# Patient Record
Sex: FEMALE | Race: WHITE | Employment: FULL TIME | URBAN - METROPOLITAN AREA
[De-identification: names, ages, dates, MRNs, and addresses within clinical notes are randomized per-mention and may not be internally consistent; named-entity substitution may affect disease eponyms.]

---

## 2024-08-16 ENCOUNTER — ANESTHESIA EVENT (OUTPATIENT)
Facility: HOSPITAL | Age: 44
End: 2024-08-16
Payer: COMMERCIAL

## 2024-08-23 ENCOUNTER — ANESTHESIA (OUTPATIENT)
Facility: HOSPITAL | Age: 44
End: 2024-08-23
Payer: COMMERCIAL

## 2024-08-23 ENCOUNTER — HOSPITAL ENCOUNTER (OUTPATIENT)
Facility: HOSPITAL | Age: 44
Setting detail: OUTPATIENT SURGERY
Discharge: HOME | End: 2024-08-23
Attending: PLASTIC SURGERY | Admitting: PLASTIC SURGERY
Payer: COMMERCIAL

## 2024-08-23 VITALS
HEIGHT: 64 IN | WEIGHT: 211.1 LBS | SYSTOLIC BLOOD PRESSURE: 109 MMHG | DIASTOLIC BLOOD PRESSURE: 65 MMHG | HEART RATE: 99 BPM | OXYGEN SATURATION: 94 % | BODY MASS INDEX: 36.04 KG/M2 | TEMPERATURE: 98 F | RESPIRATION RATE: 18 BRPM

## 2024-08-23 PROCEDURE — 6360000002 HC RX W HCPCS: Performed by: PLASTIC SURGERY

## 2024-08-23 PROCEDURE — A4217 STERILE WATER/SALINE, 500 ML: HCPCS | Performed by: PLASTIC SURGERY

## 2024-08-23 PROCEDURE — 3700000000 HC ANESTHESIA ATTENDED CARE: Performed by: PLASTIC SURGERY

## 2024-08-23 PROCEDURE — 6360000002 HC RX W HCPCS: Performed by: NURSE ANESTHETIST, CERTIFIED REGISTERED

## 2024-08-23 PROCEDURE — 7100000010 HC PHASE II RECOVERY - FIRST 15 MIN: Performed by: PLASTIC SURGERY

## 2024-08-23 PROCEDURE — 6360000002 HC RX W HCPCS: Performed by: ANESTHESIOLOGY

## 2024-08-23 PROCEDURE — 2500000003 HC RX 250 WO HCPCS: Performed by: PLASTIC SURGERY

## 2024-08-23 PROCEDURE — 7100000011 HC PHASE II RECOVERY - ADDTL 15 MIN: Performed by: PLASTIC SURGERY

## 2024-08-23 PROCEDURE — 3600000012 HC SURGERY LEVEL 2 ADDTL 15MIN: Performed by: PLASTIC SURGERY

## 2024-08-23 PROCEDURE — 2500000003 HC RX 250 WO HCPCS: Performed by: NURSE ANESTHETIST, CERTIFIED REGISTERED

## 2024-08-23 PROCEDURE — 7100000000 HC PACU RECOVERY - FIRST 15 MIN: Performed by: PLASTIC SURGERY

## 2024-08-23 PROCEDURE — 3700000001 HC ADD 15 MINUTES (ANESTHESIA): Performed by: PLASTIC SURGERY

## 2024-08-23 PROCEDURE — 2709999900 HC NON-CHARGEABLE SUPPLY: Performed by: PLASTIC SURGERY

## 2024-08-23 PROCEDURE — 2580000003 HC RX 258: Performed by: PLASTIC SURGERY

## 2024-08-23 PROCEDURE — 3600000002 HC SURGERY LEVEL 2 BASE: Performed by: PLASTIC SURGERY

## 2024-08-23 PROCEDURE — 7100000001 HC PACU RECOVERY - ADDTL 15 MIN: Performed by: PLASTIC SURGERY

## 2024-08-23 PROCEDURE — 88300 SURGICAL PATH GROSS: CPT

## 2024-08-23 RX ORDER — SODIUM CHLORIDE, SODIUM LACTATE, POTASSIUM CHLORIDE, CALCIUM CHLORIDE 600; 310; 30; 20 MG/100ML; MG/100ML; MG/100ML; MG/100ML
INJECTION, SOLUTION INTRAVENOUS CONTINUOUS
Status: DISCONTINUED | OUTPATIENT
Start: 2024-08-23 | End: 2024-08-23 | Stop reason: HOSPADM

## 2024-08-23 RX ORDER — LIDOCAINE HYDROCHLORIDE 20 MG/ML
INJECTION, SOLUTION INTRAVENOUS PRN
Status: DISCONTINUED | OUTPATIENT
Start: 2024-08-23 | End: 2024-08-23 | Stop reason: SDUPTHER

## 2024-08-23 RX ORDER — HYDROMORPHONE HYDROCHLORIDE 1 MG/ML
0.5 INJECTION, SOLUTION INTRAMUSCULAR; INTRAVENOUS; SUBCUTANEOUS EVERY 5 MIN PRN
Status: DISCONTINUED | OUTPATIENT
Start: 2024-08-23 | End: 2024-08-23 | Stop reason: HOSPADM

## 2024-08-23 RX ORDER — DEXMEDETOMIDINE HYDROCHLORIDE 100 UG/ML
INJECTION, SOLUTION INTRAVENOUS PRN
Status: DISCONTINUED | OUTPATIENT
Start: 2024-08-23 | End: 2024-08-23 | Stop reason: SDUPTHER

## 2024-08-23 RX ORDER — OXYCODONE HYDROCHLORIDE 5 MG/1
5 TABLET ORAL PRN
Status: DISCONTINUED | OUTPATIENT
Start: 2024-08-23 | End: 2024-08-23 | Stop reason: HOSPADM

## 2024-08-23 RX ORDER — ONDANSETRON 2 MG/ML
INJECTION INTRAMUSCULAR; INTRAVENOUS PRN
Status: DISCONTINUED | OUTPATIENT
Start: 2024-08-23 | End: 2024-08-23 | Stop reason: SDUPTHER

## 2024-08-23 RX ORDER — EPHEDRINE SULFATE/0.9% NACL/PF 50 MG/5 ML
SYRINGE (ML) INTRAVENOUS PRN
Status: DISCONTINUED | OUTPATIENT
Start: 2024-08-23 | End: 2024-08-23 | Stop reason: SDUPTHER

## 2024-08-23 RX ORDER — ONDANSETRON 2 MG/ML
4 INJECTION INTRAMUSCULAR; INTRAVENOUS
Status: COMPLETED | OUTPATIENT
Start: 2024-08-23 | End: 2024-08-23

## 2024-08-23 RX ORDER — OXYCODONE HYDROCHLORIDE 5 MG/1
10 TABLET ORAL PRN
Status: DISCONTINUED | OUTPATIENT
Start: 2024-08-23 | End: 2024-08-23 | Stop reason: HOSPADM

## 2024-08-23 RX ORDER — PROPOFOL 10 MG/ML
INJECTION, EMULSION INTRAVENOUS PRN
Status: DISCONTINUED | OUTPATIENT
Start: 2024-08-23 | End: 2024-08-23 | Stop reason: SDUPTHER

## 2024-08-23 RX ORDER — FENTANYL CITRATE 50 UG/ML
INJECTION, SOLUTION INTRAMUSCULAR; INTRAVENOUS PRN
Status: DISCONTINUED | OUTPATIENT
Start: 2024-08-23 | End: 2024-08-23 | Stop reason: SDUPTHER

## 2024-08-23 RX ORDER — FENTANYL CITRATE 50 UG/ML
50 INJECTION, SOLUTION INTRAMUSCULAR; INTRAVENOUS EVERY 5 MIN PRN
Status: COMPLETED | OUTPATIENT
Start: 2024-08-23 | End: 2024-08-23

## 2024-08-23 RX ORDER — NALOXONE HYDROCHLORIDE 0.4 MG/ML
INJECTION, SOLUTION INTRAMUSCULAR; INTRAVENOUS; SUBCUTANEOUS PRN
Status: DISCONTINUED | OUTPATIENT
Start: 2024-08-23 | End: 2024-08-23 | Stop reason: HOSPADM

## 2024-08-23 RX ORDER — DEXAMETHASONE SODIUM PHOSPHATE 4 MG/ML
INJECTION, SOLUTION INTRA-ARTICULAR; INTRALESIONAL; INTRAMUSCULAR; INTRAVENOUS; SOFT TISSUE PRN
Status: DISCONTINUED | OUTPATIENT
Start: 2024-08-23 | End: 2024-08-23 | Stop reason: SDUPTHER

## 2024-08-23 RX ADMIN — PROPOFOL 30 MG: 10 INJECTION, EMULSION INTRAVENOUS at 14:03

## 2024-08-23 RX ADMIN — FENTANYL CITRATE 50 MCG: 50 INJECTION, SOLUTION INTRAMUSCULAR; INTRAVENOUS at 13:18

## 2024-08-23 RX ADMIN — PROPOFOL 50 MG: 10 INJECTION, EMULSION INTRAVENOUS at 14:02

## 2024-08-23 RX ADMIN — FENTANYL CITRATE 50 MCG: 50 INJECTION INTRAMUSCULAR; INTRAVENOUS at 16:25

## 2024-08-23 RX ADMIN — FENTANYL CITRATE 50 MCG: 50 INJECTION, SOLUTION INTRAMUSCULAR; INTRAVENOUS at 14:09

## 2024-08-23 RX ADMIN — PROPOFOL 30 MCG/KG/MIN: 10 INJECTION, EMULSION INTRAVENOUS at 13:34

## 2024-08-23 RX ADMIN — PROPOFOL 200 MG: 10 INJECTION, EMULSION INTRAVENOUS at 13:07

## 2024-08-23 RX ADMIN — ONDANSETRON HYDROCHLORIDE 4 MG: 2 INJECTION INTRAMUSCULAR; INTRAVENOUS at 14:56

## 2024-08-23 RX ADMIN — DEXMEDETOMIDINE HYDROCHLORIDE 10 MCG: 100 INJECTION, SOLUTION INTRAVENOUS at 13:06

## 2024-08-23 RX ADMIN — LIDOCAINE HYDROCHLORIDE 100 MG: 20 INJECTION, SOLUTION INTRAVENOUS at 13:07

## 2024-08-23 RX ADMIN — DEXMEDETOMIDINE HYDROCHLORIDE 10 MCG: 100 INJECTION, SOLUTION INTRAVENOUS at 13:00

## 2024-08-23 RX ADMIN — PROPOFOL 30 MG: 10 INJECTION, EMULSION INTRAVENOUS at 15:26

## 2024-08-23 RX ADMIN — PROPOFOL 50 MG: 10 INJECTION, EMULSION INTRAVENOUS at 13:08

## 2024-08-23 RX ADMIN — WATER 2000 MG: 1 INJECTION INTRAMUSCULAR; INTRAVENOUS; SUBCUTANEOUS at 13:15

## 2024-08-23 RX ADMIN — SODIUM CHLORIDE, POTASSIUM CHLORIDE, SODIUM LACTATE AND CALCIUM CHLORIDE: 600; 310; 30; 20 INJECTION, SOLUTION INTRAVENOUS at 10:43

## 2024-08-23 RX ADMIN — FENTANYL CITRATE 50 MCG: 50 INJECTION INTRAMUSCULAR; INTRAVENOUS at 16:36

## 2024-08-23 RX ADMIN — ONDANSETRON 4 MG: 2 INJECTION INTRAMUSCULAR; INTRAVENOUS at 16:23

## 2024-08-23 RX ADMIN — Medication 10 MG: at 13:53

## 2024-08-23 RX ADMIN — FENTANYL CITRATE 50 MCG: 50 INJECTION, SOLUTION INTRAMUSCULAR; INTRAVENOUS at 13:41

## 2024-08-23 RX ADMIN — Medication 10 MG: at 13:30

## 2024-08-23 RX ADMIN — DEXAMETHASONE SODIUM PHOSPHATE 4 MG: 4 INJECTION, SOLUTION INTRAMUSCULAR; INTRAVENOUS at 13:21

## 2024-08-23 RX ADMIN — FENTANYL CITRATE 50 MCG: 50 INJECTION, SOLUTION INTRAMUSCULAR; INTRAVENOUS at 14:48

## 2024-08-23 RX ADMIN — SODIUM CHLORIDE, POTASSIUM CHLORIDE, SODIUM LACTATE AND CALCIUM CHLORIDE: 600; 310; 30; 20 INJECTION, SOLUTION INTRAVENOUS at 16:20

## 2024-08-23 ASSESSMENT — PAIN - FUNCTIONAL ASSESSMENT
PAIN_FUNCTIONAL_ASSESSMENT: ADULT NONVERBAL PAIN SCALE (NPVS)
PAIN_FUNCTIONAL_ASSESSMENT: 0-10
PAIN_FUNCTIONAL_ASSESSMENT: ACTIVITIES ARE NOT PREVENTED
PAIN_FUNCTIONAL_ASSESSMENT: 0-10
PAIN_FUNCTIONAL_ASSESSMENT: 0-10
PAIN_FUNCTIONAL_ASSESSMENT: ADULT NONVERBAL PAIN SCALE (NPVS)
PAIN_FUNCTIONAL_ASSESSMENT: ACTIVITIES ARE NOT PREVENTED
PAIN_FUNCTIONAL_ASSESSMENT: 0-10
PAIN_FUNCTIONAL_ASSESSMENT: ACTIVITIES ARE NOT PREVENTED
PAIN_FUNCTIONAL_ASSESSMENT: 0-10
PAIN_FUNCTIONAL_ASSESSMENT: 0-10

## 2024-08-23 ASSESSMENT — PAIN SCALES - GENERAL
PAINLEVEL_OUTOF10: 5
PAINLEVEL_OUTOF10: 6

## 2024-08-23 ASSESSMENT — PAIN DESCRIPTION - ORIENTATION: ORIENTATION: MID

## 2024-08-23 ASSESSMENT — LIFESTYLE VARIABLES: SMOKING_STATUS: 0

## 2024-08-23 ASSESSMENT — PAIN DESCRIPTION - LOCATION: LOCATION: ABDOMEN

## 2024-08-23 ASSESSMENT — PAIN DESCRIPTION - DESCRIPTORS
DESCRIPTORS: ACHING

## 2024-08-23 NOTE — PERIOP NOTE
Reviewed PTA medication list with patient/caregiver and patient/caregiver denies any additional medications.     Patient admits to having a responsible adult care for them at home for at least 24 hours after surgery.    Patient encouraged to use gown warming system and informed that using said warming gown to regulate body temperature prior to a procedure has been shown to help reduce the risks of blood clots and infection.    Patient's pharmacy of choice verified and documented in PTA medication section.    Dual skin assessment & fall risk band verification completed with Emilia ALANIZ RN.

## 2024-08-23 NOTE — DISCHARGE INSTRUCTIONS
Tummy Tuck: What to Expect at Home  Your Recovery  A tummy tuck is surgery to remove fat and skin from your belly and to tighten the stomach muscles. It is also called an abdominoplasty. The surgery makes your belly look flatter.  Your belly will be sore and swollen for the first week after surgery. You may need to wear a compression garment as you heal. The skin on your stomach will be mostly numb for several weeks to months. Feeling will return slowly. It's normal to feel tired while you are healing. It can take 5 to 6 weeks for your energy to return.  You may not be able to stand up straight when you first come home, but this will improve. You'll need to get up and walk every day to regain your normal movement. Between walks, move your feet and legs often.  A tummy tuck leaves a long scar that will fade with time. You also may have a small scar around your belly button.  This care sheet gives you a general idea about how long it will take for you to recover. But each person recovers at a different pace. Follow the steps below to get better as quickly as possible.  How can you care for yourself at home?  Activity    Rest when you feel tired. Getting enough sleep will help you recover.     Try to walk each day. Start by walking a little more than you did the day before. Bit by bit, increase the amount you walk. Walking boosts blood flow and helps prevent pneumonia and constipation.     Avoid abdominal exercises and strenuous activities, such as bicycle riding, jogging, weight lifting, or aerobic exercise, for 6 to 8 weeks.     For 6 weeks, avoid lifting anything that would make you strain. This may include heavy grocery bags and milk containers, a heavy briefcase or backpack, cat litter or dog food bags, a vacuum , or a child.     Ask your doctor when you can drive again.     Most people are able to return to work about 2 to 3 weeks after surgery. It depends on the type of work you do and how you feel.  results and keep a list of the medicines you take.  How can you care for yourself at home?  Take your pain medicine as soon as you have pain. It works better if you take it before the pain gets bad.  Call your doctor if you have any problems with your medicine.  To prevent dehydration, drink plenty of fluids. Choose water and other clear liquids until you feel better. If you have kidney, heart, or liver disease and have to limit fluids, talk with your doctor before you increase the amount of fluids you drink.  When you are able to eat, try clear soups, mild foods, and liquids until all symptoms are gone for 12 to 48 hours. Other good choices include dry toast, crackers, cooked cereal, and gelatin dessert, such as Jell-O.  Do not smoke. Smoking and being around smoke can make nausea worse.   When should you call for help?    Call your doctor now or seek immediate medical care if:    You have new or worse nausea or vomiting.     You are too sick to your stomach to drink any fluids.     You cannot keep down fluids.     You have symptoms of dehydration, such as:  Dry eyes and a dry mouth.  Passing only a little urine.  Feeling thirstier than usual.     You are dizzy or lightheaded, or you feel like you may faint.           Infection After Surgery: Care Instructions  Overview  After surgery, an infection is always possible. It doesn't mean that the surgery didn't go well.  How can you care for yourself at home?  Make sure your surgeon knows about the infection, especially if you saw another doctor about your symptoms.  If your doctor prescribed antibiotics, take them as directed. Do not stop taking them just because you feel better. You need to take the full course of antibiotics.  Keep the skin clean and dry.  You may have a bandage over the cut (incision). A bandage helps the incision heal and protects it. Your doctor will tell you how to take care of this. Keep it clean and dry. You may have drainage from the

## 2024-08-23 NOTE — H&P
HISTORY AND PHYSICAL             Date: 8/23/2024        Patient Name: Jenelle Marcial     YOB: 1980      Age:  43 y.o.    Chief Complaint    Excess abdominal skin      History Obtained From   patient    History of Present Illness   Weight loss    Past Medical History     Past Medical History:   Diagnosis Date    H/O esophageal ulcer     Migraine     Psoriatic arthritis (HCC)     PVC (premature ventricular contraction)     np Andra burr    Retention of urine     Succinylcholine adverse reaction     muscle tremors        Past Surgical History     Past Surgical History:   Procedure Laterality Date    HYSTERECTOMY (CERVIX STATUS UNKNOWN)      partial with full pelvic floor repair        Medications Prior to Admission     Prior to Admission medications    Medication Sig Start Date End Date Taking? Authorizing Provider   flecainide (TAMBOCOR) 100 MG tablet Take 1 tablet by mouth 2 times daily 6/20/24  Yes Deisi Gonzalez MD   esomeprazole Magnesium (NEXIUM) 20 MG PACK Take 1 packet by mouth daily   Yes Deisi Gonzalez MD   famotidine (PEPCID) 40 MG tablet Take 1 tablet by mouth daily   Yes Deisi Gonzalez MD   pantoprazole sodium (PROTONIX) 40 MG PACK packet Take 1 packet by mouth 2 times daily (before meals)   Yes Deisi Gonzalez MD   atorvastatin (LIPITOR) 20 MG tablet Take 1 tablet by mouth at bedtime   Yes Deisi Gonzalez MD   secukinumab (COSENTYX) 150 MG/ML SOSY Inject into the skin once Indications: psoriatic arthritis    Deisi Gonzalez MD   methotrexate (RHEUMATREX) 10 MG chemo tablet Take 2 tablets by mouth once a week Indications: wednesday    Deisi Gonzalez MD   ergocalciferol (ERGOCALCIFEROL) 1.25 MG (62825 UT) capsule Take 1 capsule by mouth once a week    Deisi Gonzalez MD   meclizine (ANTIVERT) 25 MG tablet Take 1 tablet by mouth 2 times daily as needed Indications: arrythmia    Deisi Gonzalez MD   folic acid (FOLVITE) 1 MG tablet

## 2024-08-23 NOTE — OP NOTE
Operative Note      Patient: Jenelle Marcial  YOB: 1980  MRN: [id number]    Date of Procedure: 8/23/2024    Pre-Op Diagnosis Codes:      * Encounter for cosmetic surgery [Z41.1]    Post-Op Diagnosis: Same       Procedure(s):  COSMETIC FULL TUMMY TUCK WITH LIPO HIP ROLLS \"SPEC POP\"    Surgeon(s):  Ankit Pierson MD    Assistant:   Surgical Assistant: Yuval Martinez    Anesthesia: General    Estimated Blood Loss (mL): 200     Complications: None    Specimens:   ID Type Source Tests Collected by Time Destination   A : pannus Tissue Abdomen SURGICAL PATHOLOGY Ankit Pierson MD 8/23/2024 1457        Implants:  * No implants in log *      Drains:   Closed/Suction Drain Anterior Abdomen Bulb (Active)   Site Description Clean, dry & intact 08/23/24 1502   Dressing Status Clean, dry & intact 08/23/24 1502   Drainage Appearance Serosanguinous 08/23/24 1502   Drain Status Compressed;To bulb suction 08/23/24 1502       Findings:  Infection Present At Time Of Surgery (PATOS) (choose all levels that have infection present):  No infection present  Other Findings: none    Detailed Description of Procedure:   dictated    Electronically signed by ANKIT PIERSON MD on 8/23/2024 at 3:27 PM

## 2024-08-23 NOTE — ANESTHESIA PRE PROCEDURE
Other Findings:       Anesthesia Plan      general     ASA 3       Induction: intravenous.    MIPS: Postoperative opioids intended.  Anesthetic plan and risks discussed with patient.      Plan discussed with CRNA.                Blaine Quevedo MD   8/23/2024

## 2024-08-23 NOTE — ANESTHESIA POSTPROCEDURE EVALUATION
Post-Anesthesia Evaluation & Assessment    Vitals  BP: 123/60  Temp: 98.2 °F (36.8 °C)  Temp Source: Tympanic  Pulse: 62  Respirations: 18  SpO2: 99 %  Height: 163.8 cm (5' 4.49\")  Weight - Scale: 95.8 kg (211 lb 1.6 oz)  Pain Level: 5    Nausea/Vomiting: Controlled.    Post-operative hydration adequate.    Pain managed.    Mental status & Level of consciousness: alert and oriented x 3    Neurological status: moves all extremities, sensation grossly intact    Pulmonary status: airway patent, adequate oxygenation.    Complications related to anesthesia: none    Patient has met all PACU discharge requirements.      Cliff Traylor, DO

## 2024-08-23 NOTE — PERIOP NOTE
The discharge information has been reviewed with the patient and caregiver.  Patient armband removed and shredded  The patient and caregiver verbalized understanding.  Discharge medications reviewed with the patient and caregiver and appropriate educational materials and side effects teaching were provided.

## 2024-08-23 NOTE — BRIEF OP NOTE
Brief Postoperative Note      Patient: Jenelle Marcial  YOB: 1980  MRN: [id number]    Date of Procedure: 8/23/2024    Pre-Op Diagnosis Codes:      * Encounter for cosmetic surgery [Z41.1]    Post-Op Diagnosis: Same       Procedure(s):  COSMETIC FULL TUMMY TUCK WITH LIPO HIP ROLLS \"SPEC POP\"    Surgeon(s):  Ankit Pierson MD    Assistant:  Surgical Assistant: Yuval Martinez    Anesthesia: General    Estimated Blood Loss (mL): 200     Complications: None    Specimens:   ID Type Source Tests Collected by Time Destination   A : pannus Tissue Abdomen SURGICAL PATHOLOGY Ankit Pierson MD 8/23/2024 1457        Implants:  * No implants in log *      Drains:   Closed/Suction Drain Anterior Abdomen Bulb (Active)   Site Description Clean, dry & intact 08/23/24 1502   Dressing Status Clean, dry & intact 08/23/24 1502   Drainage Appearance Serosanguinous 08/23/24 1502   Drain Status Compressed;To bulb suction 08/23/24 1502       Findings:  Infection Present At Time Of Surgery (PATOS) (choose all levels that have infection present):  No infection present  Other Findings: none    Electronically signed by ANKIT PIERSON MD on 8/23/2024 at 3:27 PM

## 2024-08-23 NOTE — PERIOP NOTE
TRANSFER - IN REPORT:    Verbal report received from OR RN and CRNA  on Jenelle Marcial  being received from OR RN  for routine post-op      Report consisted of patient's Situation, Background, Assessment and   Recommendations(SBAR).     Information from the following report(s) Nurse Handoff Report, Adult Overview, Surgery Report, Intake/Output, and MAR was reviewed with the receiving nurse.    Opportunity for questions and clarification was provided.      Assessment completed upon patient's arrival to unit and care assumed.

## 2024-08-24 NOTE — OP NOTE
[address]  [address]                            OPERATIVE REPORT      PATIENT NAME: JEM LEONE                 : 1980  MED REC NO: [id number]                       ROOM: OR  ACCOUNT NO: [id number]                       ADMIT DATE: 2024  PROVIDER: GENOVEVA Kenney MD    DATE OF SERVICE:  2024    PREOPERATIVE DIAGNOSES:  Excess abdominal skin and truncal lipodystrophy.    POSTOPERATIVE DIAGNOSES:  Excess abdominal skin and truncal lipodystrophy.    PROCEDURES PERFORMED:  Full abdominoplasty with liposuction of the hip rolls.    SURGEON:  GENOVEVA Kenney MD    ASSISTANT:  None.    ANESTHESIA:  General.    ESTIMATED BLOOD LOSS:  200 mL.    SPECIMENS REMOVED:  None.    INTRAOPERATIVE FINDINGS:  ***     COMPLICATIONS:  None.    IMPLANTS:  ***    INDICATIONS:  The patient is a 43-year-old female who has had some weight loss and has developed lot of excess skin in the anterior abdominal area.  She will undergo liposuction of the hip rolls and full abdominoplasty to take care of this.    DESCRIPTION OF PROCEDURE:  The patient was identified and marked in the preop holding area.  She had SCD stockings placed bilaterally and was given IV antibiotics preoperatively.  She was taken to the operating room, placed in a supine position and put under general anesthesia without difficulty.  Her abdomen and hips were prepped and draped in usual sterile fashion.  To begin the case, the patient had 500 mL of tumescent solution infused in to each hip roll.  Using a 5 mm MicroAire cannula, the patient had 600 mL of fat aspirated from the right hip roll and another 600 mL of fat aspirated from the left hip roll.  Next, a lower transverse incision was made in the pubic area and extended down below the iliac crest bilaterally.  The incision was deepened down through the subcutaneous tissue until the abdominal fascia was encountered.   The abdominal skin and fat was elevated up to the umbilicus, which was then circumferentially incised and left on a well-vascularized stalk to the abdominal wall.  The abdominal skin was then elevated further up to the xiphoid and the costal margins bilaterally.  The patient was relaxed and the diastasis between the rectus muscles was imbricated with multiple interrupted sutures of 0 Vicryl followed by a running suture of 0 PDS.  The bed was flexed at the waist and the excess amount of lower abdominal skin and fat was estimated, incised, and removed.  The rectus sheath was infused with 0.25% Marcaine with epinephrine.  The dead space between the abdominal skin flap and the muscle wall was closed down with multiple interrupted sutures of 0 Vicryl.  These were placed in a progressive tension fashion.  Next, a 19 round drain was placed through lateral aspect of the abdominal incision and sewn into place with a 2-0 nylon suture.  The lower transverse incision was closed with a dermal layer of 3-0 Monocryl and a running subcuticular 3-0 Monocryl.  The umbilicus was inset on the abdominal midline with a deep layer of 3-0 Monocryl and a running subcuticular 4-0 Monocryl.  Xeroform was placed in the umbilicus.  Bacitracin, Adaptic, ABDs, and abdominal compression garment was placed over the abdominal closure.  The patient tolerated the procedure well and was taken awake and alert to the recovery room in stable condition.    COMPLICATIONS:  None.    DISPOSITION:  To recovery room in stable condition.        MD MERVAT COSTELLO/AQS  D:  08/23/2024 15:37:56  T:  08/24/2024 04:51:38  JOB #:  [id number]    CC:   GENOVEVA Kenney MD

## (undated) DEVICE — DRESSING STERILE PETRO W3XL8IN N ADH OIL EMUL GZ CURAD

## (undated) DEVICE — GARMENT,MEDLINE,DVT,INT,CALF,MED, GEN2: Brand: MEDLINE

## (undated) DEVICE — SPONGE LAP W18XL18IN WHT COT 4 PLY FLD STRUNG RADPQ DISP ST 2 PER PACK

## (undated) DEVICE — SUTURE VICRYL SZ 3-0 L54IN ABSRB UD LIGAPAK REEL POLYGLACTIN J285G

## (undated) DEVICE — APPLICATOR MEDICATED 26 CC SOLUTION HI LT ORNG CHLORAPREP

## (undated) DEVICE — YANKAUER,FLEXIBLE HANDLE,REGLR CAPACITY: Brand: MEDLINE INDUSTRIES, INC.

## (undated) DEVICE — SUTURE VICRYL + SZ 0 L18IN ABSRB UD L36MM CT-1 1/2 CIR VCP840D

## (undated) DEVICE — Device

## (undated) DEVICE — DRAIN SURG 19FR 0.25IN SIL RND W/ TRCR INDIC DOT RADPQ FULL

## (undated) DEVICE — SHEET,DRAPE,40X58,STERILE: Brand: MEDLINE

## (undated) DEVICE — PAD,ABDOMINAL,5"X9",ST,LF,25/BX: Brand: MEDLINE INDUSTRIES, INC.

## (undated) DEVICE — GOWN,AURORA,NONRNF,XL,30/CS: Brand: MEDLINE

## (undated) DEVICE — SUTURE PDS II SZ 0 L18IN ABSRB VLT L36MM CT-1 1/2 CIR Z740D

## (undated) DEVICE — GLOVE ORANGE PI 8   MSG9080

## (undated) DEVICE — SUTURE VICRYL SZ 0 L36IN ABSRB UD CT-1 L36MM 1/2 CIR TAPR PNT VCP946H

## (undated) DEVICE — BINDER PREMIER PRO ABD 10INCH UNIV LTX FREE

## (undated) DEVICE — DRESSING,GAUZE,XEROFORM,CURAD,1"X8",ST: Brand: CURAD

## (undated) DEVICE — SUTURE NONABSORBABLE MONOFILAMENT 2-0 FS 18 IN ETHILON 664H

## (undated) DEVICE — MINOR: Brand: MEDLINE INDUSTRIES, INC.

## (undated) DEVICE — BINDER ABD H12IN FOR 30-45IN WAIST UNIV 4 PNL PREM DSGN E

## (undated) DEVICE — SUTURE PROL SZ 0 L30IN NONABSORBABLE BLU L36MM CT-1 1/2 CIR 8424H